# Patient Record
Sex: FEMALE | Race: BLACK OR AFRICAN AMERICAN | NOT HISPANIC OR LATINO | Employment: FULL TIME | URBAN - METROPOLITAN AREA
[De-identification: names, ages, dates, MRNs, and addresses within clinical notes are randomized per-mention and may not be internally consistent; named-entity substitution may affect disease eponyms.]

---

## 2017-02-27 DIAGNOSIS — F41.1 GAD (GENERALIZED ANXIETY DISORDER): ICD-10-CM

## 2017-02-27 DIAGNOSIS — F33.1 MAJOR DEPRESSIVE DISORDER, RECURRENT EPISODE, MODERATE (H): ICD-10-CM

## 2017-02-27 NOTE — TELEPHONE ENCOUNTER
ESCITALOPRAM 10MG     Last Written Prescription Date: 10/6/2016  Last Fill Quantity: 30, # refills: 1  Last Office Visit with [facility] primary care provider:  11/23/2016        Last PHQ-9 score on record=   PHQ-9 SCORE 10/6/2016   Total Score -   Total Score 8

## 2017-02-28 NOTE — TELEPHONE ENCOUNTER
Pt schedule telephone visit on 3/2 @ 9:20 am with pcp, please send pt's Rx to the pharmacy until her appt, she is out.Trish Kraus, GILBERT

## 2017-02-28 NOTE — TELEPHONE ENCOUNTER
Routing refill request to provider for review/approval because:  PHQ 9 >4, please sign if ok.  Estrella Allison, RN  Triage Nurse

## 2017-03-01 RX ORDER — ESCITALOPRAM OXALATE 10 MG/1
10 TABLET ORAL DAILY
Qty: 30 TABLET | Refills: 0 | Status: SHIPPED | OUTPATIENT
Start: 2017-03-01 | End: 2017-03-02

## 2017-03-02 ENCOUNTER — VIRTUAL VISIT (OUTPATIENT)
Dept: PEDIATRICS | Facility: CLINIC | Age: 42
End: 2017-03-02
Payer: COMMERCIAL

## 2017-03-02 DIAGNOSIS — F33.1 MAJOR DEPRESSIVE DISORDER, RECURRENT EPISODE, MODERATE (H): Primary | ICD-10-CM

## 2017-03-02 DIAGNOSIS — F41.1 GAD (GENERALIZED ANXIETY DISORDER): ICD-10-CM

## 2017-03-02 PROCEDURE — 99442 ZZC PHYSICIAN TELEPHONE EVALUATION 11-20 MIN: CPT | Performed by: INTERNAL MEDICINE

## 2017-03-02 RX ORDER — ESCITALOPRAM OXALATE 10 MG/1
TABLET ORAL
Qty: 90 TABLET | Refills: 3 | Status: SHIPPED | OUTPATIENT
Start: 2017-03-02 | End: 2018-05-30

## 2017-03-02 ASSESSMENT — ANXIETY QUESTIONNAIRES
3. WORRYING TOO MUCH ABOUT DIFFERENT THINGS: SEVERAL DAYS
6. BECOMING EASILY ANNOYED OR IRRITABLE: SEVERAL DAYS
1. FEELING NERVOUS, ANXIOUS, OR ON EDGE: NOT AT ALL
IF YOU CHECKED OFF ANY PROBLEMS ON THIS QUESTIONNAIRE, HOW DIFFICULT HAVE THESE PROBLEMS MADE IT FOR YOU TO DO YOUR WORK, TAKE CARE OF THINGS AT HOME, OR GET ALONG WITH OTHER PEOPLE: NOT DIFFICULT AT ALL
GAD7 TOTAL SCORE: 2
2. NOT BEING ABLE TO STOP OR CONTROL WORRYING: NOT AT ALL
5. BEING SO RESTLESS THAT IT IS HARD TO SIT STILL: NOT AT ALL
7. FEELING AFRAID AS IF SOMETHING AWFUL MIGHT HAPPEN: NOT AT ALL

## 2017-03-02 ASSESSMENT — PATIENT HEALTH QUESTIONNAIRE - PHQ9: 5. POOR APPETITE OR OVEREATING: NOT AT ALL

## 2017-03-02 NOTE — MR AVS SNAPSHOT
"              After Visit Summary   3/2/2017    Emelia Woodruff    MRN: [id number]           Patient Information     Date Of Birth          1975        Visit Information        Provider Department      3/2/2017 9:20 AM Brigida Salas MD [facility]        Today's Diagnoses     Major depressive disorder, recurrent episode, moderate (H)    -  1    DOMONIQUE (generalized anxiety disorder)           Follow-ups after your visit        Who to contact     If you have questions or need follow up information about today's clinic visit or your schedule please contact [facility] directly at [phone].  Normal or non-critical lab and imaging results will be communicated to you by Kapow Eventshart, letter or phone within 4 business days after the clinic has received the results. If you do not hear from us within 7 days, please contact the clinic through [employer] or phone. If you have a critical or abnormal lab result, we will notify you by phone as soon as possible.  Submit refill requests through LOANZ or call your pharmacy and they will forward the refill request to us. Please allow 3 business days for your refill to be completed.          Additional Information About Your Visit        MyChart Information     LOANZ lets you send messages to your doctor, view your test results, renew your prescriptions, schedule appointments and more. To sign up, go to [email or web address] . Click on \"Log in\" on the left side of the screen, which will take you to the Welcome page. Then click on \"Sign up Now\" on the right side of the page.     You will be asked to enter the access code listed below, as well as some personal information. Please follow the directions to create your username and password.     Your access code is: [email or web address]  Expires: 2017  9:45 AM     Your access code will  in 90 days. If you need help or a new code, please call your [facility] or [phone].        Care " EveryWhere ID     This is your Care EveryWhere ID. This could be used by other organizations to access your [place] medical records  [id number]         Blood Pressure from Last 3 Encounters:   10/06/16 120/86   09/23/15 100/72   02/19/15 110/78    Weight from Last 3 Encounters:   10/06/16 166 lb 2 oz (75.4 kg)   09/23/15 170 lb (77.1 kg)   02/19/15 166 lb 8 oz (75.5 kg)              Today, you had the following     No orders found for display         Today's Medication Changes          These changes are accurate as of: 3/2/17  9:45 AM.  If you have any questions, ask your nurse or doctor.               These medicines have changed or have updated prescriptions.        Dose/Directions    escitalopram 10 MG tablet   Commonly known as:  LEXAPRO   This may have changed:    - how much to take  - how to take this  - when to take this  - additional instructions   Used for:  Major depressive disorder, recurrent episode, moderate (H), DOMONIQUE (generalized anxiety disorder)   Changed by:  Brigida Salas MD        5mg po daily for 7 days then increase to 10mg   Quantity:  90 tablet   Refills:  3            Where to get your medicines      These medications were sent to [facility] [employer] [facility] [facility] [place] [address]  [address], [facility] [address]     Phone:  [phone]     escitalopram 10 MG tablet                Primary Care Provider Office Phone # Fax #    Brigida Salas [phone]       [address] [place] [address]        Thank you!     Thank you for choosing [facility]  for your care. Our goal is always to provide you with excellent care. Hearing back from our patients is one way we can continue to improve our services. Please take a few minutes to complete the written survey that you may receive in the mail after your visit with us. Thank you!             Your Updated Medication List - Protect others around  you: Learn how to safely use, store and throw away your medicines at www.disposemymeds.org.          This list is accurate as of: 3/2/17  9:45 AM.  Always use your most recent med list.                   Brand Name Dispense Instructions for use    escitalopram 10 MG tablet    LEXAPRO    90 tablet    5mg po daily for 7 days then increase to 10mg

## 2017-03-02 NOTE — PROGRESS NOTES
"Emelia CHAVEZ María Elena Woodruff is a 41 year old female who presents for a phone visit to follow up the lexapro.  She notes that she \"forgot\" about it and stopped taking them for a little while and did not feel right- a little dizzy, etc.  She feels that it was working, noticed a difference in mood too when she stopped.  She notice some increased anxiety symptoms when she stopped medications.  Is not as reactive.  She is still having a problem staying full time at work, using PTO.  Has noticed a return in getting teary at work.  Has been off lexapro over a month.         Past medical and surgical history, family and social history, allegies and meds reviewed and updated in Epic.   Review Of Systems: all systems reviewed and are negative except as above.       PE: There were no vitals taken for this visit.   Estimated body mass index is 26.41 kg/(m^2) as calculated from the following:    Height as of 10/6/16: 5' 6.5\" (1.689 m).    Weight as of 10/6/16: 166 lb 2 oz (75.4 kg).  Virtual visit       ASSESSMENT / PLAN:  I spent 15 min on phone with patient over 50% in counseling on issues as detailed below    (F33.1) Major depressive disorder, recurrent episode, moderate (H)  -pt off medication  -restart lexapro  -encouraged therapy  -recheck with phone visit in 2 months  Plan: escitalopram (LEXAPRO) 10 MG tablet          (F41.1) DOMONIQUE (generalized anxiety disorder)  -restart medications as above  Plan: escitalopram (LEXAPRO) 10 MG tablet                    "

## 2017-03-03 ASSESSMENT — ANXIETY QUESTIONNAIRES: GAD7 TOTAL SCORE: 2

## 2017-03-03 ASSESSMENT — PATIENT HEALTH QUESTIONNAIRE - PHQ9: SUM OF ALL RESPONSES TO PHQ QUESTIONS 1-9: 4

## 2017-04-21 ENCOUNTER — OFFICE VISIT (OUTPATIENT)
Dept: URGENT CARE | Facility: URGENT CARE | Age: 42
End: 2017-04-21
Payer: COMMERCIAL

## 2017-04-21 VITALS
HEART RATE: 65 BPM | BODY MASS INDEX: 25.44 KG/M2 | SYSTOLIC BLOOD PRESSURE: 108 MMHG | TEMPERATURE: 97.9 F | OXYGEN SATURATION: 99 % | DIASTOLIC BLOOD PRESSURE: 70 MMHG | RESPIRATION RATE: 16 BRPM | WEIGHT: 160 LBS

## 2017-04-21 DIAGNOSIS — S86.012A RUPTURE OF ACHILLES TENDON, LEFT, INITIAL ENCOUNTER: Primary | ICD-10-CM

## 2017-04-21 PROCEDURE — 99213 OFFICE O/P EST LOW 20 MIN: CPT | Performed by: FAMILY MEDICINE

## 2017-04-21 NOTE — PROGRESS NOTES
"SUBJECTIVE:  Chief Complaint   Patient presents with     Urgent Care     left lower leg/ankle pain, slight swelling and tingling. happened last night after stretching in bed heard \"pop\".     Emelia Woodruff is a 41 year old female presents with a chief complaint of pain at the distal aspect of the Achilles tendon.  Last night, patient was stretching her left lower leg (dorsiflexion of the left foot) when she heard a \"pop\" and sudden cramping at the Achilles tendon area. .  Since then, patient has noticed pain, swelling, tingling, and pain when trying to walk.    The injury occurred last night.      Past Medical History:   Diagnosis Date     NO ACTIVE PROBLEMS      Current Outpatient Prescriptions   Medication Sig Dispense Refill     escitalopram (LEXAPRO) 10 MG tablet 5mg po daily for 7 days then increase to 10mg 90 tablet 3     Social History   Substance Use Topics     Smoking status: Former Smoker     Quit date: 5/5/2011     Smokeless tobacco: Never Used      Comment: 3 cigarettes a day     Alcohol use No       ROS:  Review of systems negative except as stated above.    EXAM:   /70 (BP Location: Right arm, Patient Position: Chair, Cuff Size: Adult Regular)  Pulse 65  Temp 97.9  F (36.6  C) (Tympanic)  Resp 16  Wt 160 lb (72.6 kg)  SpO2 99%  BMI 25.44 kg/m2  Gen: healthy,alert, but in severe pain.    Extremity: Left Leg has minimal edema.  There is pain over the Achilles tendon.  Haque squeeze test elicited plantar flexion at the left foot.  Patient is unable to dorsiflex the left foot.   GAIT:  Patient unable to bear weight onto the left foot.      X-RAY was not done.    ASSESSMENT:   Possible rupture of the left Achilles Tendon.      PLAN:  1) Patient will follow up with an Orthopedic Surgeon today.  I ordered a referral to see an orthopedic surgeon.      Angel Mckeon MD    "

## 2017-04-21 NOTE — MR AVS SNAPSHOT
After Visit Summary   4/21/2017    Emelia Woodruff    MRN: [id number]           Patient Information     Date Of Birth          1975        Visit Information        Provider Department      4/21/2017 9:30 AM Angel Mckeon MD [facility]        Today's Diagnoses     Rupture of Achilles tendon, left, initial encounter    -  1      Care Instructions    follow up with an orthopedic surgeon today for further evaluation.             Follow-ups after your visit        Additional Services     ORTHOPEDICS ADULT REFERRAL       Your provider has referred you to: PREFERRED PROVIDERS:  [facility]: [facility] - [facility] [phone]   [email or web address]  OTHER PROVIDERS:  [facility] [place] [phone]   [email or web address] [phone]   [email or web address] [phone]   [email or web address] [phone]   [email or web address]    Please be aware that coverage of these services is subject to the terms and limitations of your health insurance plan.  Call member services at your health plan with any benefit or coverage questions.      Please bring the following to your appointment:    >>   Any x-rays, CTs or MRIs which have been performed.  Contact the facility where they were done to arrange for  prior to your scheduled appointment.    >>   List of current medications   >>   This referral request   >>   Any documents/labs given to you for this referral                  Your next 10 appointments already scheduled     May 02, 2017  9:00 AM CDT   Telephone Visit with Brigida Salas MD   [facility]    [address] [place] [address]  [address]  [facility] [address] [phone]           Note: this is  "not an onsite visit; there is no need to come to the facility.              Who to contact     If you have questions or need follow up information about today's clinic visit or your schedule please contact [facility] directly at [phone].  Normal or non-critical lab and imaging results will be communicated to you by MyChart, letter or phone within 4 business days after the clinic has received the results. If you do not hear from us within 7 days, please contact the clinic through MobiTXhart or phone. If you have a critical or abnormal lab result, we will notify you by phone as soon as possible.  Submit refill requests through Mapori or call your pharmacy and they will forward the refill request to us. Please allow 3 business days for your refill to be completed.          Additional Information About Your Visit        Mobi[employer]uj[employer] Information     Mapori lets you send messages to your doctor, view your test results, renew your prescriptions, schedule appointments and more. To sign up, go to [email or web address] . Click on \"Log in\" on the left side of the screen, which will take you to the Welcome page. Then click on \"Sign up Now\" on the right side of the page.     You will be asked to enter the access code listed below, as well as some personal information. Please follow the directions to create your username and password.     Your access code is: [email or web address]  Expires: 2017 10:45 AM     Your access code will  in 90 days. If you need help or a new code, please call your [place] clinic or [phone].        Care EveryWhere ID     This is your Care EveryWhere ID. This could be used by other organizations to access your [place] medical records  [id number]        Your Vitals Were     Pulse Temperature Respirations Pulse Oximetry BMI (Body Mass Index)       65 97.9  F (36.6  C) (Tympanic) 16 99% 25.44 kg/m2        Blood Pressure from Last 3 Encounters:   17 108/70   10/06/16 120/86 "   09/23/15 100/72    Weight from Last 3 Encounters:   04/21/17 160 lb (72.6 kg)   10/06/16 166 lb 2 oz (75.4 kg)   09/23/15 170 lb (77.1 kg)              We Performed the Following     ORTHOPEDICS ADULT REFERRAL        Primary Care Provider Office Phone # Fax #    Brigida Salas [phone]       [address] [place] [address]        Thank you!     Thank you for choosing [facility]  for your care. Our goal is always to provide you with excellent care. Hearing back from our patients is one way we can continue to improve our services. Please take a few minutes to complete the written survey that you may receive in the mail after your visit with us. Thank you!             Your Updated Medication List - Protect others around you: Learn how to safely use, store and throw away your medicines at www.disposemymeds.org.          This list is accurate as of: 4/21/17  9:58 AM.  Always use your most recent med list.                   Brand Name Dispense Instructions for use    escitalopram 10 MG tablet    LEXAPRO    90 tablet    5mg po daily for 7 days then increase to 10mg

## 2017-10-07 ENCOUNTER — HEALTH MAINTENANCE LETTER (OUTPATIENT)
Age: 42
End: 2017-10-07

## 2018-01-18 ENCOUNTER — TELEPHONE (OUTPATIENT)
Dept: PEDIATRICS | Facility: CLINIC | Age: 43
End: 2018-01-18

## 2018-01-18 NOTE — TELEPHONE ENCOUNTER
"Patient Communication Preferences indicate  Do not contact  and/or communication by \"Phone\" is not preferred. Call not required per Outreach team.        Outreach ,  Shabbir Ladd     "

## 2018-06-02 ENCOUNTER — NURSE TRIAGE (OUTPATIENT)
Dept: NURSING | Facility: CLINIC | Age: 43
End: 2018-06-02

## 2018-06-02 NOTE — TELEPHONE ENCOUNTER
Patient called wanting to know if a refill for her Lexapro (see below)  was completed yesterday. Reported to patient that the prescription was refilled and sent to [facility]. Patient is requesting the prescription be sent to the [facility] in [place]. This writer reported that she will complete patient's request and call her back after the prescription has been called in to requested pharmacy. Patient agreed with plan.    This writer called [facility] at [phone] and spoke with pharmacist giving verbal order with read back for [address],  75, Escitalopram (Lexapro) 10 mg tablet, dispense 30 tablets, 0 refills, Take 1 tablet (10 mg) by mouth daily - oral.    This writer called patient back and informed her that the prescription should be ready soon at requested pharmacy. Patient did not have any further questions or concerns.    escitalopram (LEXAPRO) 10 MG tablet 30 tablet 0 2018     Sig - Route: Take 1 tablet (10 mg) by mouth daily - Oral    Class: E-Prescribe        Additional Information    Negative: Drug overdose and nurse unable to answer question    Negative: Caller requesting information not related to medicine    Negative: Caller requesting a prescription for Strep throat and has a positive culture result    Negative: Rash while taking a medication or within 3 days of stopping it    Negative: Immunization reaction suspected    Negative: [1] Asthma and [2] having symptoms of asthma (cough, wheezing, etc)    Negative: MORE THAN A DOUBLE DOSE of a prescription or over-the-counter (OTC) drug    Negative: [1] DOUBLE DOSE (an extra dose or lesser amount) of over-the-counter (OTC) drug AND [2] any symptoms (e.g., dizziness, nausea, pain, sleepiness)    Negative: [1] DOUBLE DOSE (an extra dose or lesser amount) of prescription drug AND [2] any symptoms (e.g., dizziness, nausea, pain, sleepiness)    Negative: Took another person's prescription  "drug    Negative: [1] DOUBLE DOSE (an extra dose or lesser amount) of prescription drug AND [2] NO symptoms (Exception: a double dose of antibiotics)    Negative: Diabetes drug error or overdose (e.g., insulin or extra dose)    Negative: [1] Request for URGENT new prescription or refill of \"essential\" medication (i.e., likelihood of harm to patient if not taken) AND [2] triager unable to fill per unit policy    Negative: [1] Prescription not at pharmacy AND [2] was prescribed today by PCP    Negative: Pharmacy calling with prescription questions and triager unable to answer question    Negative: Caller has URGENT medication question about med that PCP prescribed and triager unable to answer question    Negative: Caller has NON-URGENT medication question about med that PCP prescribed and triager unable to answer question    Negative: Caller requesting a NON-URGENT new prescription or refill and triager unable to refill per unit policy    Negative: Caller has medication question about med not prescribed by PCP and triager unable to answer question (e.g., compatibility with other med, storage)    Negative: [1] DOUBLE DOSE (an extra dose or lesser amount) of over-the-counter (OTC) drug AND [2] NO symptoms (all triage questions negative)    Negative: [1] DOUBLE DOSE (an extra dose or lesser amount) of antibiotic drug AND [2] NO symptoms (all triage questions negative)    Caller has medication question only, adult not sick, and triager answers question    Protocols used: MEDICATION QUESTION CALL-ADULT-    Aster Yang RN/[facility] [facility]    "

## 2018-07-11 DIAGNOSIS — F41.1 GAD (GENERALIZED ANXIETY DISORDER): ICD-10-CM

## 2018-07-11 DIAGNOSIS — F33.1 MAJOR DEPRESSIVE DISORDER, RECURRENT EPISODE, MODERATE (H): ICD-10-CM

## 2018-07-11 RX ORDER — ESCITALOPRAM OXALATE 10 MG/1
10 TABLET ORAL DAILY
Qty: 30 TABLET | Refills: 0 | Status: CANCELLED | OUTPATIENT
Start: 2018-07-11

## 2018-07-11 NOTE — TELEPHONE ENCOUNTER
"Requested Prescriptions   Pending Prescriptions Disp Refills     escitalopram (LEXAPRO) 10 MG tablet    Last Written Prescription Date:  6/1/2018  Last Fill Quantity: 30,  # refills: 0   Last office visit: 3/2/2017 with prescribing provider:  Brigida Salas     Future Office Visit:     30 tablet 0     Sig: Take 1 tablet (10 mg) by mouth daily    SSRIs Protocol Failed    7/11/2018  9:42 AM       Failed - PHQ-9 score less than 5 in past 6 months    Please review last PHQ-9 score.     PHQ-9 SCORE 9/23/2015 10/6/2016 3/2/2017   Total Score - - -   Total Score 3 8 4     DOMONIQUE-7 SCORE 9/23/2015 10/6/2016 3/2/2017   Total Score - - -   Total Score 0 10 2                Failed - Recent (6 mo) or future (30 days) visit within the authorizing provider's specialty    Patient had office visit in the last 6 months or has a visit in the next 30 days with authorizing provider or within the authorizing provider's specialty.  See \"Patient Info\" tab in inbasket, or \"Choose Columns\" in Meds & Orders section of the refill encounter.           Passed - Patient is age 18 or older       Passed - No active pregnancy on record       Passed - No positive pregnancy test in last 12 months          "

## 2018-07-15 ENCOUNTER — TELEPHONE (OUTPATIENT)
Dept: NURSING | Facility: CLINIC | Age: 43
End: 2018-07-15

## 2018-07-15 NOTE — TELEPHONE ENCOUNTER
"  [facility] Triage Call  Presenting Problem:\"I need a refill on   escitalopram (LEXAPRO) 10 MG tablet 30 tablet 0 6/1/2018  No   Sig - Route: Take 1 tablet (10 mg) by mouth daily - Oral     I don't have a car, I am in [place], it makes it hard for me to get in to see the doctor.\"   Patient Recommendations/Teaching:Per epic, failed refill protocol, needs appt. Advised to call clinic in am when open to see if she can do an E visit(per pt. Request)  Chanel Crane RN [place] [facility]          "

## 2018-07-16 NOTE — TELEPHONE ENCOUNTER
Will not refill until patient makes and keeps appt.  She can have a same day appointment this week, or she can come in tomorrow at 4 pm.  Brigida Salas MD

## 2018-07-16 NOTE — TELEPHONE ENCOUNTER
Routing refill request to provider for review/approval because:  Adele given x1 and patient did not follow up, please advise  Pt no showed to last 2 appts  Justin Sánchez RN, BSN

## 2018-07-24 NOTE — TELEPHONE ENCOUNTER
Called patient and she does not need this medication refilled she states that she saw a different doctor and had it refilled by other provider.

## 2022-12-31 ENCOUNTER — OFFICE VISIT (OUTPATIENT)
Dept: URGENT CARE | Facility: URGENT CARE | Age: 47
End: 2022-12-31
Payer: COMMERCIAL

## 2022-12-31 VITALS
TEMPERATURE: 97.9 F | OXYGEN SATURATION: 100 % | RESPIRATION RATE: 16 BRPM | SYSTOLIC BLOOD PRESSURE: 124 MMHG | DIASTOLIC BLOOD PRESSURE: 84 MMHG | HEART RATE: 76 BPM

## 2022-12-31 DIAGNOSIS — T30.0 BURN: Primary | ICD-10-CM

## 2022-12-31 PROCEDURE — 99203 OFFICE O/P NEW LOW 30 MIN: CPT | Performed by: FAMILY MEDICINE

## 2022-12-31 RX ORDER — CEPHALEXIN 500 MG/1
500 CAPSULE ORAL 3 TIMES DAILY
Qty: 21 CAPSULE | Refills: 0 | Status: SHIPPED | OUTPATIENT
Start: 2022-12-31 | End: 2023-01-07

## 2022-12-31 ASSESSMENT — PAIN SCALES - GENERAL: PAINLEVEL: SEVERE PAIN (6)

## 2022-12-31 NOTE — PROGRESS NOTES
Assessment & Plan     Burn  Topical cares and oral anbx  - cephALEXin (KEFLEX) 500 MG capsule  Dispense: 21 capsule; Refill: 0             No follow-ups on file.    Rubin Meeks MD  [facility] SHERRI Kapoor is a 47 year old female who presents to clinic today for the following health issues:  Chief Complaint   Patient presents with     Urgent Care     Burned foot on chicken grease from about 3 nights ago. Pt feels like her foot may be infected. Pt says her foot seems to be swelling as well.      HPI    Burned with grease on top of foot.  Swelling/pain  Has been using antibiotic cream and cover        Review of Systems        Objective    /84   Pulse 76   Temp 97.9  F (36.6  C) (Tympanic)   Resp 16   SpO2 100%   Breastfeeding No   Physical Exam  Vitals and nursing note reviewed.   Constitutional:       Appearance: Normal appearance.   Skin:     Comments: Quarter sized area of blistery skin that has ruptured    Foot is swollen and slightly red   Neurological:      Mental Status: She is alert.